# Patient Record
Sex: FEMALE
[De-identification: names, ages, dates, MRNs, and addresses within clinical notes are randomized per-mention and may not be internally consistent; named-entity substitution may affect disease eponyms.]

---

## 2024-06-20 ENCOUNTER — NON-APPOINTMENT (OUTPATIENT)
Age: 59
End: 2024-06-20

## 2024-06-21 ENCOUNTER — NON-APPOINTMENT (OUTPATIENT)
Age: 59
End: 2024-06-21

## 2024-06-25 PROBLEM — Z00.00 ENCOUNTER FOR PREVENTIVE HEALTH EXAMINATION: Status: ACTIVE | Noted: 2024-06-25

## 2024-06-27 ENCOUNTER — APPOINTMENT (OUTPATIENT)
Dept: ORTHOPEDIC SURGERY | Facility: CLINIC | Age: 59
End: 2024-06-27
Payer: SELF-PAY

## 2024-06-27 VITALS — HEIGHT: 62 IN | WEIGHT: 140 LBS | BODY MASS INDEX: 25.76 KG/M2

## 2024-06-27 DIAGNOSIS — S52.509A UNSPECIFIED FRACTURE OF THE LOWER END OF UNSPECIFIED RADIUS, INITIAL ENCOUNTER FOR CLOSED FRACTURE: ICD-10-CM

## 2024-06-27 PROCEDURE — 73110 X-RAY EXAM OF WRIST: CPT | Mod: RT

## 2024-06-27 PROCEDURE — 99203 OFFICE O/P NEW LOW 30 MIN: CPT

## 2024-07-16 ENCOUNTER — APPOINTMENT (OUTPATIENT)
Dept: ORTHOPEDIC SURGERY | Facility: CLINIC | Age: 59
End: 2024-07-16
Payer: SELF-PAY

## 2024-07-16 DIAGNOSIS — S52.509A UNSPECIFIED FRACTURE OF THE LOWER END OF UNSPECIFIED RADIUS, INITIAL ENCOUNTER FOR CLOSED FRACTURE: ICD-10-CM

## 2024-07-16 PROCEDURE — 99212 OFFICE O/P EST SF 10 MIN: CPT

## 2024-08-06 ENCOUNTER — APPOINTMENT (OUTPATIENT)
Dept: ORTHOPEDIC SURGERY | Facility: CLINIC | Age: 59
End: 2024-08-06

## 2024-08-06 PROCEDURE — 99212 OFFICE O/P EST SF 10 MIN: CPT

## 2024-08-06 NOTE — HISTORY OF PRESENT ILLNESS
[de-identified] : 8/6/24: The patient returns for repeat evaluation of her right distal radius fracture treated nonoperatively.  She has been in a removable brace and gradually working on her range of motion as previously instructed.  She continues to have some mild dull ache with certain activities.  No new injuries, numbness or tingling.  06/27/2024 DEVIN CLARKE 58 year here today for:  Location:  right wrist Complaint: The patient presents today for evaluation of right wrist pain after mechanical fall last week.  She noted immediate pain and was seen at an urgent care where x-rays were obtained and she was placed in a splint with instructions to follow-up as an outpatient.  Today the patient continues to note mild pain but denies numbness and tingling.  Symptom onset:  6/21/24 Prior treatments: Splint Hand Dominance:  right  Occupation:  unemployed -homemaker PMH: Denies Allergies: NKDA

## 2024-08-06 NOTE — IMAGING
[de-identified] : Right wrist No wounds, ecchymosis or swelling NTTP at the distal radius Reduced wrist ROM due to stiffness, improved from prior Able to make a full composite fist nvi